# Patient Record
Sex: FEMALE | Race: WHITE | HISPANIC OR LATINO | Employment: FULL TIME | URBAN - METROPOLITAN AREA
[De-identification: names, ages, dates, MRNs, and addresses within clinical notes are randomized per-mention and may not be internally consistent; named-entity substitution may affect disease eponyms.]

---

## 2023-05-26 LAB
HUMAN PAPILLOMAVIRUS (HPV): POSITIVE
PAP RECOMMENDATION EXT: NORMAL
PAP SMEAR: NORMAL

## 2023-11-21 DIAGNOSIS — N83.202 BILATERAL OVARIAN CYSTS: ICD-10-CM

## 2023-11-21 DIAGNOSIS — N83.201 BILATERAL OVARIAN CYSTS: ICD-10-CM

## 2023-11-21 DIAGNOSIS — N83.201 UNSPECIFIED OVARIAN CYST, RIGHT SIDE: Primary | ICD-10-CM

## 2023-11-28 ENCOUNTER — HOSPITAL ENCOUNTER (OUTPATIENT)
Dept: RADIOLOGY | Facility: HOSPITAL | Age: 33
Discharge: HOME | End: 2023-11-28
Attending: SPECIALIST
Payer: OTHER GOVERNMENT

## 2023-11-28 DIAGNOSIS — N83.201 BILATERAL OVARIAN CYSTS: ICD-10-CM

## 2023-11-28 DIAGNOSIS — N83.202 BILATERAL OVARIAN CYSTS: ICD-10-CM

## 2023-11-28 PROCEDURE — 76830 US PELVIS COMP WITH TRANSVAG NON-OB (XPD): ICD-10-PCS | Mod: 26,,, | Performed by: RADIOLOGY

## 2023-11-28 PROCEDURE — 76830 TRANSVAGINAL US NON-OB: CPT | Mod: TC

## 2023-11-28 PROCEDURE — 76856 US EXAM PELVIC COMPLETE: CPT | Mod: 26,,, | Performed by: RADIOLOGY

## 2023-11-28 PROCEDURE — 76856 US PELVIS COMP WITH TRANSVAG NON-OB (XPD): ICD-10-PCS | Mod: 26,,, | Performed by: RADIOLOGY

## 2023-11-28 PROCEDURE — 76830 TRANSVAGINAL US NON-OB: CPT | Mod: 26,,, | Performed by: RADIOLOGY

## 2023-11-29 ENCOUNTER — TELEPHONE (OUTPATIENT)
Dept: FAMILY MEDICINE | Facility: CLINIC | Age: 33
End: 2023-11-29

## 2023-11-29 NOTE — TELEPHONE ENCOUNTER
Spoke with pt and confirmed new pt appt. Informed pt to bring all current medications to appt and if pt no shows we will be unable to reschedule them. Pt voiced understanding.

## 2023-12-05 ENCOUNTER — OFFICE VISIT (OUTPATIENT)
Dept: FAMILY MEDICINE | Facility: CLINIC | Age: 33
End: 2023-12-05
Payer: OTHER GOVERNMENT

## 2023-12-05 VITALS
SYSTOLIC BLOOD PRESSURE: 100 MMHG | HEIGHT: 63 IN | DIASTOLIC BLOOD PRESSURE: 70 MMHG | WEIGHT: 162 LBS | BODY MASS INDEX: 28.7 KG/M2 | HEART RATE: 69 BPM

## 2023-12-05 DIAGNOSIS — R53.83 FATIGUE, UNSPECIFIED TYPE: ICD-10-CM

## 2023-12-05 DIAGNOSIS — L65.9 HAIR LOSS: ICD-10-CM

## 2023-12-05 DIAGNOSIS — D50.9 IRON DEFICIENCY ANEMIA, UNSPECIFIED IRON DEFICIENCY ANEMIA TYPE: ICD-10-CM

## 2023-12-05 DIAGNOSIS — R68.89 INTOLERANCE TO COLD: ICD-10-CM

## 2023-12-05 DIAGNOSIS — Z76.89 ENCOUNTER TO ESTABLISH CARE: Primary | ICD-10-CM

## 2023-12-05 DIAGNOSIS — R63.1 EXCESSIVE THIRST: ICD-10-CM

## 2023-12-05 DIAGNOSIS — Z13.220 SCREENING CHOLESTEROL LEVEL: ICD-10-CM

## 2023-12-05 PROBLEM — M20.10 HALLUX VALGUS: Status: ACTIVE | Noted: 2023-04-13

## 2023-12-05 PROBLEM — H11.132 CONJUNCTIVAL PIGMENTATIONS, LEFT EYE: Status: ACTIVE | Noted: 2017-12-22

## 2023-12-05 PROCEDURE — 99204 OFFICE O/P NEW MOD 45 MIN: CPT | Mod: S$GLB,,,

## 2023-12-05 PROCEDURE — 99204 PR OFFICE/OUTPT VISIT, NEW, LEVL IV, 45-59 MIN: ICD-10-PCS | Mod: S$GLB,,,

## 2023-12-05 NOTE — PROGRESS NOTES
"  SUBJECTIVE:    Patient ID: Samia Higgins is a 33 y.o. female.    Chief Complaint: Establish Care (No medicine, Fatigue, loss hair, pt is fasting for blood work, abc )    33-year-old established female patient presents to clinic today to establish care.    Past medical history, surgical history, family history, social history, current medications, allergies reviewed.    Patient has acute complaints today of chronic extreme fatigue and thinning hair.  She states that her hair began sitting out last year.  She has never been pregnant.  She states she is also always cold.  She is fasting today and would like "blood work. "    She just recently found out that she has fibroids.  She was having some pelvic pain and she did see her OBGYN.  She states they are not causing her any extreme vaginal bleeding though her periods can be on the heavier side.  They are however regular..  She also states that her Pap smear did come back abnormal but her OB gyn back home that she was seeing prior to moving here did tell her that she was negative for strain 16 and strain 18 which was good news.  She is established care here with Dr. Maldonado who is following up.    She does drink occasionally.  She has never been a smoker.  She does not use illicit drugs.  She is only on a multi vitamin.    She states that despite daily walking, running when the weather is good, yoga she has had a little bit of weight gain earlier in the year.    She does wear glasses.  She had an eye exam this year.        Office Visit on 12/05/2023   Component Date Value Ref Range Status    WBC 12/05/2023 9.0  3.8 - 10.8 Thousand/uL Final    RBC 12/05/2023 4.79  3.80 - 5.10 Million/uL Final    Hemoglobin 12/05/2023 13.8  11.7 - 15.5 g/dL Final    Hematocrit 12/05/2023 41.6  35.0 - 45.0 % Final    MCV 12/05/2023 86.8  80.0 - 100.0 fL Final    MCH 12/05/2023 28.8  27.0 - 33.0 pg Final    MCHC 12/05/2023 33.2  32.0 - 36.0 g/dL Final    RDW 12/05/2023 13.0  11.0 - 15.0 % " Final    Platelets 12/05/2023 342  140 - 400 Thousand/uL Final    MPV 12/05/2023 10.4  7.5 - 12.5 fL Final    Neutrophils, Abs 12/05/2023 4,968  1,500 - 7,800 cells/uL Final    Lymph # 12/05/2023 3,213  850 - 3,900 cells/uL Final    Mono # 12/05/2023 702  200 - 950 cells/uL Final    Eos # 12/05/2023 36  15 - 500 cells/uL Final    Baso # 12/05/2023 81  0 - 200 cells/uL Final    Neutrophils Relative 12/05/2023 55.2  % Final    Lymph % 12/05/2023 35.7  % Final    Mono % 12/05/2023 7.8  % Final    Eosinophil % 12/05/2023 0.4  % Final    Basophil % 12/05/2023 0.9  % Final    Iron 12/05/2023 125  40 - 190 mcg/dL Final    TIBC 12/05/2023 327  250 - 450 mcg/dL (calc) Final    Iron Saturation 12/05/2023 38  16 - 45 % (calc) Final    Glucose 12/05/2023 74  65 - 99 mg/dL Final    BUN 12/05/2023 7  7 - 25 mg/dL Final    Creatinine 12/05/2023 0.70  0.50 - 0.97 mg/dL Final    eGFR 12/05/2023 118  > OR = 60 mL/min/1.73m2 Final    BUN/Creatinine Ratio 12/05/2023 SEE NOTE:  6 - 22 (calc) Final    Sodium 12/05/2023 139  135 - 146 mmol/L Final    Potassium 12/05/2023 4.4  3.5 - 5.3 mmol/L Final    Chloride 12/05/2023 103  98 - 110 mmol/L Final    CO2 12/05/2023 26  20 - 32 mmol/L Final    Calcium 12/05/2023 10.1  8.6 - 10.2 mg/dL Final    Total Protein 12/05/2023 7.3  6.1 - 8.1 g/dL Final    Albumin 12/05/2023 4.9  3.6 - 5.1 g/dL Final    Globulin, Total 12/05/2023 2.4  1.9 - 3.7 g/dL (calc) Final    Albumin/Globulin Ratio 12/05/2023 2.0  1.0 - 2.5 (calc) Final    Total Bilirubin 12/05/2023 0.7  0.2 - 1.2 mg/dL Final    Alkaline Phosphatase 12/05/2023 44  31 - 125 U/L Final    AST 12/05/2023 22  10 - 30 U/L Final    ALT 12/05/2023 21  6 - 29 U/L Final    Hemoglobin A1C 12/05/2023 5.5  <5.7 % of total Hgb Final    Cholesterol 12/05/2023 158  <200 mg/dL Final    HDL 12/05/2023 76  > OR = 50 mg/dL Final    Triglycerides 12/05/2023 93  <150 mg/dL Final    LDL Cholesterol 12/05/2023 64  mg/dL (calc) Final    HDL/Cholesterol Ratio  12/05/2023 2.1  <5.0 (calc) Final    Non HDL Chol. (LDL+VLDL) 12/05/2023 82  <130 mg/dL (calc) Final    T4, Free 12/05/2023 1.1  0.8 - 1.8 ng/dL Final    TSH 12/05/2023 2.01  mIU/L Final    Color, UA 12/05/2023 YELLOW  YELLOW Final    Appearance, UA 12/05/2023 CLEAR  CLEAR Final    Specific Gravity, UA 12/05/2023 1.004  1.001 - 1.035 Final    pH, UA 12/05/2023 8.0  5.0 - 8.0 Final    Glucose, UA 12/05/2023 NEGATIVE  NEGATIVE Final    Bilirubin, UA 12/05/2023 NEGATIVE  NEGATIVE Final    Ketones, UA 12/05/2023 NEGATIVE  NEGATIVE Final    Occult Blood UA 12/05/2023 NEGATIVE  NEGATIVE Final    Protein, UA 12/05/2023 NEGATIVE  NEGATIVE Final    Nitrite, UA 12/05/2023 NEGATIVE  NEGATIVE Final    Leukocytes, UA 12/05/2023 NEGATIVE  NEGATIVE Final    WBC Casts, UA 12/05/2023 NONE SEEN  < OR = 5 /HPF Final    RBC Casts, UA 12/05/2023 NONE SEEN  < OR = 2 /HPF Final    Squam Epithel, UA 12/05/2023 NONE SEEN  < OR = 5 /HPF Final    Bacteria, UA 12/05/2023 NONE SEEN  NONE SEEN /HPF Final    Hyaline Casts, UA 12/05/2023 NONE SEEN  NONE SEEN /LPF Final    Service Cmt: 12/05/2023    Final    Reflexive Urine Culture 12/05/2023    Final       No past medical history on file.  Social History     Socioeconomic History    Marital status:    Tobacco Use    Smoking status: Never    Smokeless tobacco: Never     No past surgical history on file.  No family history on file.    Review of patient's allergies indicates:   Allergen Reactions    Corn starch      Other Reaction(s): corn syrup causes redness over chest    Grass pollen-june grass standard Other (See Comments)    House dust Other (See Comments)       Current Outpatient Medications:     multivit with minerals/lutein (MULTIVITAMIN 50 PLUS ORAL), , Disp: , Rfl:     Review of Systems   Constitutional:  Positive for fatigue. Negative for activity change, appetite change, chills, diaphoresis, fever and unexpected weight change.   HENT:  Positive for postnasal drip. Negative for  "nasal congestion, ear pain, rhinorrhea, sore throat and trouble swallowing.    Eyes:  Negative for discharge and visual disturbance.   Respiratory:  Negative for apnea, cough, shortness of breath and wheezing.    Cardiovascular:  Negative for chest pain, palpitations and leg swelling.   Gastrointestinal:  Negative for abdominal distention, abdominal pain, blood in stool, constipation, diarrhea, nausea, vomiting and reflux.   Endocrine: Positive for cold intolerance.   Genitourinary:  Positive for pelvic pain. Negative for bladder incontinence, dysuria, frequency and urgency.        Found out she has fibroids   Musculoskeletal:  Negative for arthralgias, gait problem, leg pain and myalgias.   Integumentary:  Negative for rash and mole/lesion.   Allergic/Immunologic: Positive for environmental allergies.   Neurological:  Negative for dizziness, tremors, weakness, light-headedness, numbness and headaches.   Hematological:  Does not bruise/bleed easily.   Psychiatric/Behavioral:  Negative for dysphoric mood, sleep disturbance and suicidal ideas. The patient is not nervous/anxious.            Objective:      Vitals:    12/05/23 1119   BP: 100/70   Pulse: 69   Weight: 73.5 kg (162 lb)   Height: 5' 3" (1.6 m)     Physical Exam  Vitals and nursing note reviewed.   Constitutional:       General: She is not in acute distress.     Appearance: Normal appearance. She is well-developed, well-groomed and normal weight. She is not ill-appearing.   HENT:      Head: Normocephalic and atraumatic.      Right Ear: Tympanic membrane and external ear normal.      Left Ear: Tympanic membrane and external ear normal.      Nose: Nose normal. No rhinorrhea.      Mouth/Throat:      Mouth: Mucous membranes are moist.      Pharynx: Oropharynx is clear. No posterior oropharyngeal erythema.   Eyes:      General: No scleral icterus.     Pupils: Pupils are equal, round, and reactive to light.   Neck:      Thyroid: No thyromegaly.      Vascular: No " carotid bruit.   Cardiovascular:      Rate and Rhythm: Normal rate and regular rhythm.      Heart sounds: Normal heart sounds. No murmur heard.  Pulmonary:      Effort: Pulmonary effort is normal.      Breath sounds: Normal breath sounds. No wheezing or rales.   Abdominal:      General: Bowel sounds are normal.      Palpations: Abdomen is soft.      Tenderness: There is no abdominal tenderness.   Musculoskeletal:         General: Normal range of motion.      Cervical back: Normal range of motion and neck supple.      Lumbar back: Normal. No spasms.      Right lower leg: No edema.      Left lower leg: No edema.      Comments: Bends 90 degrees at  waist, good range of motion throughout   Skin:     General: Skin is warm and dry.      Capillary Refill: Capillary refill takes less than 2 seconds.      Coloration: Skin is not jaundiced or pale.      Findings: No rash.   Neurological:      General: No focal deficit present.      Mental Status: She is alert and oriented to person, place, and time.      Cranial Nerves: No cranial nerve deficit.      Motor: No weakness.      Coordination: Coordination normal.      Gait: Gait normal.   Psychiatric:         Mood and Affect: Mood normal.         Behavior: Behavior normal. Behavior is cooperative.         Thought Content: Thought content normal.         Judgment: Judgment normal.           Assessment:       1. Encounter to establish care    2. Hair loss    3. Fatigue, unspecified type    4. Excessive thirst    5. Intolerance to cold    6. Screening cholesterol level    7. Iron deficiency anemia, unspecified iron deficiency anemia type         Plan:       Encounter to establish care  See HPI.  Patient concerned that she may have something going on hormonally.   -     CBC Auto Differential; Future; Expected date: 12/05/2023  -     Urinalysis, Reflex to Urine Culture Urine, Clean Catch; Future; Expected date: 12/05/2023    Hair loss  Would like her thyroid checked.  -     T4,  Free; Future; Expected date: 12/05/2023  -     TSH; Future; Expected date: 12/05/2023    Fatigue, unspecified type  Also has a history of anemia.  -     T4, Free; Future; Expected date: 12/05/2023  -     TSH; Future; Expected date: 12/05/2023    Excessive thirst  Feels like she has been craving a lot more water lately.  -     Comprehensive Metabolic Panel; Future; Expected date: 12/05/2023  -     Hemoglobin A1C; Future; Expected date: 12/05/2023    Intolerance to cold  Labs for evaluation and treatment.  -     T4, Free; Future; Expected date: 12/05/2023  -     TSH; Future; Expected date: 12/05/2023    Screening cholesterol level  Screening as recommended.  -     Lipid Panel; Future; Expected date: 12/05/2023    Iron deficiency anemia, unspecified iron deficiency anemia type  History of anemia.  -     CBC Auto Differential; Future; Expected date: 12/05/2023  -     Iron and TIBC; Future; Expected date: 12/05/2023      Follow up in about 6 months (around 6/5/2024), or if symptoms worsen or fail to improve, for or follow up sooner depending on results.        12/26/2023 Lisa Rebolledo

## 2023-12-06 ENCOUNTER — PATIENT OUTREACH (OUTPATIENT)
Dept: ADMINISTRATIVE | Facility: HOSPITAL | Age: 33
End: 2023-12-06
Payer: OTHER GOVERNMENT

## 2023-12-06 NOTE — PROGRESS NOTES
Population Health Chart Review & Patient Outreach Details    Outreach Performed: NO    Additional Pop Health Notes:           Updates Requested / Reviewed:      Updated Care Coordination Note, External Sources: [facility] and [facility], and Immunizations Reconciliation Completed or Queried: [place]         Health Maintenance Topics Overdue:    Health Maintenance Due   Topic Date Due    Hepatitis C Screening  Never done    Cervical Cancer Screening  Never done    COVID-19 Vaccine (1) Never done    HIV Screening  Never done    TETANUS VACCINE  03/23/2021         Health Maintenance Topic(s) Outreach Outcomes & Actions Taken:    Cervical Cancer Screening - Outreach Outcomes & Actions Taken  : External Records Requested & Care Team Updated if Applicable

## 2023-12-06 NOTE — LETTER
AUTHORIZATION FOR RELEASE OF   CONFIDENTIAL INFORMATION    Dear Ross Fletcher,    We are seeing Samia Higgins, date of birth 1990, in the clinic at [address]. Esau Colon MD is the patient's PCP. Samia Higgins has an outstanding lab/procedure at the time we reviewed her chart. In order to help keep her health information updated, she has authorized us to request the following medical record(s):                                                                    ( X )  PAP SMEAR ( Most Recent )                                                   Please fax records to [facility], Casey, Michael D., MD, [phone]     If you have any questions, please contact     [facility]  Clinical Care Coordinator    [facility]  [phone] (Phone)  [phone] (Fax)    Patient Name: Samia Higgins  : 1990  Patient Phone #: [phone]

## 2023-12-07 ENCOUNTER — TELEPHONE (OUTPATIENT)
Dept: FAMILY MEDICINE | Facility: CLINIC | Age: 33
End: 2023-12-07

## 2023-12-07 LAB
ALBUMIN SERPL-MCNC: 4.9 G/DL (ref 3.6–5.1)
ALBUMIN/GLOB SERPL: 2 (CALC) (ref 1–2.5)
ALP SERPL-CCNC: 44 U/L (ref 31–125)
ALT SERPL-CCNC: 21 U/L (ref 6–29)
APPEARANCE UR: CLEAR
AST SERPL-CCNC: 22 U/L (ref 10–30)
BACTERIA #/AREA URNS HPF: NORMAL /HPF
BACTERIA UR CULT: NORMAL
BASOPHILS # BLD AUTO: 81 CELLS/UL (ref 0–200)
BASOPHILS NFR BLD AUTO: 0.9 %
BILIRUB SERPL-MCNC: 0.7 MG/DL (ref 0.2–1.2)
BILIRUB UR QL STRIP: NEGATIVE
BUN SERPL-MCNC: 7 MG/DL (ref 7–25)
BUN/CREAT SERPL: NORMAL (CALC) (ref 6–22)
CALCIUM SERPL-MCNC: 10.1 MG/DL (ref 8.6–10.2)
CHLORIDE SERPL-SCNC: 103 MMOL/L (ref 98–110)
CHOLEST SERPL-MCNC: 158 MG/DL
CHOLEST/HDLC SERPL: 2.1 (CALC)
CO2 SERPL-SCNC: 26 MMOL/L (ref 20–32)
COLOR UR: YELLOW
CREAT SERPL-MCNC: 0.7 MG/DL (ref 0.5–0.97)
EGFR: 118 ML/MIN/1.73M2
EOSINOPHIL # BLD AUTO: 36 CELLS/UL (ref 15–500)
EOSINOPHIL NFR BLD AUTO: 0.4 %
ERYTHROCYTE [DISTWIDTH] IN BLOOD BY AUTOMATED COUNT: 13 % (ref 11–15)
GLOBULIN SER CALC-MCNC: 2.4 G/DL (CALC) (ref 1.9–3.7)
GLUCOSE SERPL-MCNC: 74 MG/DL (ref 65–99)
GLUCOSE UR QL STRIP: NEGATIVE
HBA1C MFR BLD: 5.5 % OF TOTAL HGB
HCT VFR BLD AUTO: 41.6 % (ref 35–45)
HDLC SERPL-MCNC: 76 MG/DL
HGB BLD-MCNC: 13.8 G/DL (ref 11.7–15.5)
HGB UR QL STRIP: NEGATIVE
HYALINE CASTS #/AREA URNS LPF: NORMAL /LPF
IRON SATN MFR SERPL: 38 % (CALC) (ref 16–45)
IRON SERPL-MCNC: 125 MCG/DL (ref 40–190)
KETONES UR QL STRIP: NEGATIVE
LDLC SERPL CALC-MCNC: 64 MG/DL (CALC)
LEUKOCYTE ESTERASE UR QL STRIP: NEGATIVE
LYMPHOCYTES # BLD AUTO: 3213 CELLS/UL (ref 850–3900)
LYMPHOCYTES NFR BLD AUTO: 35.7 %
MCH RBC QN AUTO: 28.8 PG (ref 27–33)
MCHC RBC AUTO-ENTMCNC: 33.2 G/DL (ref 32–36)
MCV RBC AUTO: 86.8 FL (ref 80–100)
MONOCYTES # BLD AUTO: 702 CELLS/UL (ref 200–950)
MONOCYTES NFR BLD AUTO: 7.8 %
NEUTROPHILS # BLD AUTO: 4968 CELLS/UL (ref 1500–7800)
NEUTROPHILS NFR BLD AUTO: 55.2 %
NITRITE UR QL STRIP: NEGATIVE
NONHDLC SERPL-MCNC: 82 MG/DL (CALC)
PH UR STRIP: 8 [PH] (ref 5–8)
PLATELET # BLD AUTO: 342 THOUSAND/UL (ref 140–400)
PMV BLD REES-ECKER: 10.4 FL (ref 7.5–12.5)
POTASSIUM SERPL-SCNC: 4.4 MMOL/L (ref 3.5–5.3)
PROT SERPL-MCNC: 7.3 G/DL (ref 6.1–8.1)
PROT UR QL STRIP: NEGATIVE
RBC # BLD AUTO: 4.79 MILLION/UL (ref 3.8–5.1)
RBC #/AREA URNS HPF: NORMAL /HPF
SERVICE CMNT-IMP: NORMAL
SODIUM SERPL-SCNC: 139 MMOL/L (ref 135–146)
SP GR UR STRIP: 1 (ref 1–1.03)
SQUAMOUS #/AREA URNS HPF: NORMAL /HPF
T4 FREE SERPL-MCNC: 1.1 NG/DL (ref 0.8–1.8)
TIBC SERPL-MCNC: 327 MCG/DL (CALC) (ref 250–450)
TRIGL SERPL-MCNC: 93 MG/DL
TSH SERPL-ACNC: 2.01 MIU/L
WBC # BLD AUTO: 9 THOUSAND/UL (ref 3.8–10.8)
WBC #/AREA URNS HPF: NORMAL /HPF

## 2023-12-07 NOTE — TELEPHONE ENCOUNTER
Spoke with patient gave lab results verbatim per Rebecca DE PAZ Patient verbalized understanding. No further questions.

## 2023-12-07 NOTE — PROGRESS NOTES
Let MsDelfin Ronaldo know that absolutely all of her labs are completely within normal ranges. Superb!

## 2023-12-21 ENCOUNTER — PATIENT OUTREACH (OUTPATIENT)
Dept: ADMINISTRATIVE | Facility: HOSPITAL | Age: 33
End: 2023-12-21
Payer: OTHER GOVERNMENT

## 2023-12-21 NOTE — PROGRESS NOTES
Population Health Chart Review & Patient Outreach Details    Outreach Performed: NO    Additional Pop Health Notes:           Updates Requested / Reviewed:      Updated Care Coordination Note         Health Maintenance Topics Overdue:    Health Maintenance Due   Topic Date Due    Hepatitis C Screening  Never done    COVID-19 Vaccine (1) Never done    HIV Screening  Never done    TETANUS VACCINE  03/23/2021         Health Maintenance Topic(s) Outreach Outcomes & Actions Taken:    Cervical Cancer Screening - Outreach Outcomes & Actions Taken  : External Records Uploaded & Care Team Updated if Applicable

## 2024-03-19 ENCOUNTER — OFFICE VISIT (OUTPATIENT)
Dept: FAMILY MEDICINE | Facility: CLINIC | Age: 34
End: 2024-03-19
Payer: OTHER GOVERNMENT

## 2024-03-19 VITALS
TEMPERATURE: 99 F | WEIGHT: 160.88 LBS | SYSTOLIC BLOOD PRESSURE: 104 MMHG | HEIGHT: 63 IN | HEART RATE: 72 BPM | OXYGEN SATURATION: 98 % | DIASTOLIC BLOOD PRESSURE: 72 MMHG | BODY MASS INDEX: 28.5 KG/M2

## 2024-03-19 DIAGNOSIS — H65.92 LEFT OTITIS MEDIA WITH EFFUSION: ICD-10-CM

## 2024-03-19 DIAGNOSIS — R42 VERTIGO: Primary | ICD-10-CM

## 2024-03-19 PROCEDURE — 99213 OFFICE O/P EST LOW 20 MIN: CPT | Mod: S$GLB,,,

## 2024-03-19 RX ORDER — MECLIZINE HCL 12.5 MG 12.5 MG/1
TABLET ORAL
Qty: 20 TABLET | Refills: 1 | Status: SHIPPED | OUTPATIENT
Start: 2024-03-19

## 2024-03-19 RX ORDER — CHOLECALCIFEROL (VITAMIN D3) 25 MCG
1000 TABLET ORAL DAILY
COMMUNITY

## 2024-03-19 RX ORDER — AMOXICILLIN 875 MG/1
875 TABLET, FILM COATED ORAL EVERY 12 HOURS
Qty: 14 TABLET | Refills: 0 | Status: SHIPPED | OUTPATIENT
Start: 2024-03-19 | End: 2024-03-26

## 2024-03-19 NOTE — PATIENT INSTRUCTIONS
"Make continue to take zofran if needed just for nausea    Take meclizine for dizziness as needed, as directed on prescription    May continue to take antihistamine to "dry up" allergies    Take all your antibiotics for ear infection. If you do not feel better in a week, let me know.       "

## 2024-03-19 NOTE — PROGRESS NOTES
SUBJECTIVE:    Patient ID: Samia Higgins is a 34 y.o. female.    Chief Complaint: Dizziness (4 days with vertigo, has gotten better, right ear feels like draining but no fluid coming out/ did Elpey maneuver and when tilted got dizzier/ had some nausea/ denies illness/ possibly allergies per patient/ no ear pain no ear fullness//mp)    34-year-old established female patient presents to clinic today with complaints of dizziness x4 days.  She states her right ear feels like it is draining but there is no fluid coming out.  She has had some nausea.      Dizziness:    Associated symptoms: ear pain.      Patient Outreach on 12/21/2023   Component Date Value Ref Range Status    PAP Recommendation External 05/26/2023 Cotesting in 5 years   Final    Pap 05/26/2023 Negative for intraephithelial lesion or malignancy  Negative for intraephithelial lesion or malignancy, Other Final    HPV DNA 05/26/2023 Positive (A)  None Detected Final   Office Visit on 12/05/2023   Component Date Value Ref Range Status    WBC 12/05/2023 9.0  3.8 - 10.8 Thousand/uL Final    RBC 12/05/2023 4.79  3.80 - 5.10 Million/uL Final    Hemoglobin 12/05/2023 13.8  11.7 - 15.5 g/dL Final    Hematocrit 12/05/2023 41.6  35.0 - 45.0 % Final    MCV 12/05/2023 86.8  80.0 - 100.0 fL Final    MCH 12/05/2023 28.8  27.0 - 33.0 pg Final    MCHC 12/05/2023 33.2  32.0 - 36.0 g/dL Final    RDW 12/05/2023 13.0  11.0 - 15.0 % Final    Platelets 12/05/2023 342  140 - 400 Thousand/uL Final    MPV 12/05/2023 10.4  7.5 - 12.5 fL Final    Neutrophils, Abs 12/05/2023 4,968  1,500 - 7,800 cells/uL Final    Lymph # 12/05/2023 3,213  850 - 3,900 cells/uL Final    Mono # 12/05/2023 702  200 - 950 cells/uL Final    Eos # 12/05/2023 36  15 - 500 cells/uL Final    Baso # 12/05/2023 81  0 - 200 cells/uL Final    Neutrophils Relative 12/05/2023 55.2  % Final    Lymph % 12/05/2023 35.7  % Final    Mono % 12/05/2023 7.8  % Final    Eosinophil % 12/05/2023 0.4  % Final    Basophil %  12/05/2023 0.9  % Final    Iron 12/05/2023 125  40 - 190 mcg/dL Final    TIBC 12/05/2023 327  250 - 450 mcg/dL (calc) Final    Iron Saturation 12/05/2023 38  16 - 45 % (calc) Final    Glucose 12/05/2023 74  65 - 99 mg/dL Final    BUN 12/05/2023 7  7 - 25 mg/dL Final    Creatinine 12/05/2023 0.70  0.50 - 0.97 mg/dL Final    eGFR 12/05/2023 118  > OR = 60 mL/min/1.73m2 Final    BUN/Creatinine Ratio 12/05/2023 SEE NOTE:  6 - 22 (calc) Final    Sodium 12/05/2023 139  135 - 146 mmol/L Final    Potassium 12/05/2023 4.4  3.5 - 5.3 mmol/L Final    Chloride 12/05/2023 103  98 - 110 mmol/L Final    CO2 12/05/2023 26  20 - 32 mmol/L Final    Calcium 12/05/2023 10.1  8.6 - 10.2 mg/dL Final    Total Protein 12/05/2023 7.3  6.1 - 8.1 g/dL Final    Albumin 12/05/2023 4.9  3.6 - 5.1 g/dL Final    Globulin, Total 12/05/2023 2.4  1.9 - 3.7 g/dL (calc) Final    Albumin/Globulin Ratio 12/05/2023 2.0  1.0 - 2.5 (calc) Final    Total Bilirubin 12/05/2023 0.7  0.2 - 1.2 mg/dL Final    Alkaline Phosphatase 12/05/2023 44  31 - 125 U/L Final    AST 12/05/2023 22  10 - 30 U/L Final    ALT 12/05/2023 21  6 - 29 U/L Final    Hemoglobin A1C 12/05/2023 5.5  <5.7 % of total Hgb Final    Cholesterol 12/05/2023 158  <200 mg/dL Final    HDL 12/05/2023 76  > OR = 50 mg/dL Final    Triglycerides 12/05/2023 93  <150 mg/dL Final    LDL Cholesterol 12/05/2023 64  mg/dL (calc) Final    HDL/Cholesterol Ratio 12/05/2023 2.1  <5.0 (calc) Final    Non HDL Chol. (LDL+VLDL) 12/05/2023 82  <130 mg/dL (calc) Final    T4, Free 12/05/2023 1.1  0.8 - 1.8 ng/dL Final    TSH 12/05/2023 2.01  mIU/L Final    Color, UA 12/05/2023 YELLOW  YELLOW Final    Appearance, UA 12/05/2023 CLEAR  CLEAR Final    Specific Gravity, UA 12/05/2023 1.004  1.001 - 1.035 Final    pH, UA 12/05/2023 8.0  5.0 - 8.0 Final    Glucose, UA 12/05/2023 NEGATIVE  NEGATIVE Final    Bilirubin, UA 12/05/2023 NEGATIVE  NEGATIVE Final    Ketones, UA 12/05/2023 NEGATIVE  NEGATIVE Final    Occult Blood UA  12/05/2023 NEGATIVE  NEGATIVE Final    Protein, UA 12/05/2023 NEGATIVE  NEGATIVE Final    Nitrite, UA 12/05/2023 NEGATIVE  NEGATIVE Final    Leukocytes, UA 12/05/2023 NEGATIVE  NEGATIVE Final    WBC Casts, UA 12/05/2023 NONE SEEN  < OR = 5 /HPF Final    RBC Casts, UA 12/05/2023 NONE SEEN  < OR = 2 /HPF Final    Squam Epithel, UA 12/05/2023 NONE SEEN  < OR = 5 /HPF Final    Bacteria, UA 12/05/2023 NONE SEEN  NONE SEEN /HPF Final    Hyaline Casts, UA 12/05/2023 NONE SEEN  NONE SEEN /LPF Final    Service Cmt: 12/05/2023    Final    Reflexive Urine Culture 12/05/2023    Final       History reviewed. No pertinent past medical history.  Social History     Socioeconomic History    Marital status:    Tobacco Use    Smoking status: Never    Smokeless tobacco: Never     History reviewed. No pertinent surgical history.  History reviewed. No pertinent family history.    Review of patient's allergies indicates:   Allergen Reactions    Corn starch      Other Reaction(s): corn syrup causes redness over chest    Grass pollen-june grass standard Other (See Comments)    House dust Other (See Comments)       Current Outpatient Medications:     microfibrillar collagen powder, Apply 1 g topically once daily., Disp: , Rfl:     multivit with minerals/lutein (MULTIVITAMIN 50 PLUS ORAL), , Disp: , Rfl:     vitamin D (VITAMIN D3) 1000 units Tab, Take 1,000 Units by mouth once daily., Disp: , Rfl:     amoxicillin (AMOXIL) 875 MG tablet, Take 1 tablet (875 mg total) by mouth every 12 (twelve) hours. for 7 days, Disp: 14 tablet, Rfl: 0    meclizine (ANTIVERT) 12.5 mg tablet, Take one tablet or two tablets every 8 hours as needed for dizziness/vertigo, Disp: 20 tablet, Rfl: 1    Review of Systems   HENT:  Positive for ear pain.    Neurological:  Positive for dizziness and vertigo.           Objective:      Vitals:    03/19/24 0926   BP: 104/72   Pulse: 72   Temp: 99.1 °F (37.3 °C)   SpO2: 98%   Weight: 73 kg (160 lb 14.4 oz)   Height: 5'  "3" (1.6 m)     Physical Exam  Constitutional:       General: She is not in acute distress.     Appearance: Normal appearance. She is not ill-appearing.   HENT:      Head: Normocephalic and atraumatic.      Right Ear: Tympanic membrane, ear canal and external ear normal.      Left Ear: A middle ear effusion is present. Tympanic membrane is erythematous and bulging.      Nose: Nose normal. No congestion.      Mouth/Throat:      Mouth: Mucous membranes are moist.      Pharynx: Oropharynx is clear.   Eyes:      General:         Right eye: No discharge.         Left eye: No discharge.   Cardiovascular:      Pulses: Normal pulses.   Pulmonary:      Effort: Pulmonary effort is normal.   Lymphadenopathy:      Cervical: No cervical adenopathy.   Skin:     General: Skin is warm and dry.   Neurological:      General: No focal deficit present.      Mental Status: She is alert.      Coordination: Coordination normal.           Assessment:       1. Vertigo    2. Left otitis media with effusion         Plan:       Vertigo  Take 1-2 tablets of meclizine as needed every 8 hours for dizziness  -     meclizine (ANTIVERT) 12.5 mg tablet; Take one tablet or two tablets every 8 hours as needed for dizziness/vertigo  Dispense: 20 tablet; Refill: 1    Left otitis media with effusion  Take 1 tablet of amoxicillin twice a day for 7 days.  Continue to take even if you feel better.  -     amoxicillin (AMOXIL) 875 MG tablet; Take 1 tablet (875 mg total) by mouth every 12 (twelve) hours. for 7 days  Dispense: 14 tablet; Refill: 0      Follow up if symptoms worsen or fail to improve.        3/24/2024 Lisa Rebolledo"

## 2025-01-02 ENCOUNTER — HOSPITAL ENCOUNTER (EMERGENCY)
Facility: HOSPITAL | Age: 35
Discharge: HOME | End: 2025-01-02
Attending: EMERGENCY MEDICINE
Payer: OTHER GOVERNMENT

## 2025-01-02 VITALS
HEIGHT: 63 IN | SYSTOLIC BLOOD PRESSURE: 118 MMHG | TEMPERATURE: 98 F | WEIGHT: 160 LBS | OXYGEN SATURATION: 100 % | HEART RATE: 72 BPM | RESPIRATION RATE: 16 BRPM | BODY MASS INDEX: 28.35 KG/M2 | DIASTOLIC BLOOD PRESSURE: 78 MMHG

## 2025-01-02 DIAGNOSIS — R56.9 SEIZURE-LIKE ACTIVITY: Primary | ICD-10-CM
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
## 2025-01-02 LAB
ALBUMIN SERPL BCP-MCNC: 4.7 G/DL (ref 3.5–5.2)
ALP SERPL-CCNC: 55 U/L (ref 55–135)
ALT SERPL W/O P-5'-P-CCNC: 18 U/L (ref 10–44)
AMPHET+METHAMPHET UR QL: NEGATIVE
ANION GAP SERPL CALC-SCNC: 5 MMOL/L (ref 8–16)
AST SERPL-CCNC: 15 U/L (ref 10–40)
B-HCG UR QL: NEGATIVE
BARBITURATES UR QL SCN>200 NG/ML: NEGATIVE
BASOPHILS # BLD AUTO: 0.07 K/UL (ref 0–0.2)
BASOPHILS NFR BLD: 0.7 % (ref 0–1.9)
BENZODIAZ UR QL SCN>200 NG/ML: NEGATIVE
BILIRUB SERPL-MCNC: 0.4 MG/DL (ref 0.1–1)
BILIRUB UR QL STRIP: NEGATIVE
BUN SERPL-MCNC: 8 MG/DL (ref 6–20)
BZE UR QL SCN: NEGATIVE
CALCIUM SERPL-MCNC: 9.5 MG/DL (ref 8.7–10.5)
CANNABINOIDS UR QL SCN: NEGATIVE
CHLORIDE SERPL-SCNC: 107 MMOL/L (ref 95–110)
CLARITY UR: CLEAR
CO2 SERPL-SCNC: 27 MMOL/L (ref 23–29)
COLOR UR: COLORLESS
CREAT SERPL-MCNC: 0.7 MG/DL (ref 0.5–1.4)
CREAT UR-MCNC: 28 MG/DL (ref 15–325)
CTP QC/QA: YES
DIFFERENTIAL METHOD BLD: NORMAL
EOSINOPHIL # BLD AUTO: 0.1 K/UL (ref 0–0.5)
EOSINOPHIL NFR BLD: 0.7 % (ref 0–8)
ERYTHROCYTE [DISTWIDTH] IN BLOOD BY AUTOMATED COUNT: 14.4 % (ref 11.5–14.5)
EST. GFR  (NO RACE VARIABLE): >60 ML/MIN/1.73 M^2
GLUCOSE SERPL-MCNC: 98 MG/DL (ref 70–110)
GLUCOSE UR QL STRIP: NEGATIVE
HCT VFR BLD AUTO: 40.7 % (ref 37–48.5)
HGB BLD-MCNC: 13.2 G/DL (ref 12–16)
HGB UR QL STRIP: NEGATIVE
IMM GRANULOCYTES # BLD AUTO: 0.04 K/UL (ref 0–0.04)
IMM GRANULOCYTES NFR BLD AUTO: 0.4 % (ref 0–0.5)
KETONES UR QL STRIP: NEGATIVE
LACTATE SERPL-SCNC: 1.4 MMOL/L (ref 0.5–1.9)
LEUKOCYTE ESTERASE UR QL STRIP: NEGATIVE
LYMPHOCYTES # BLD AUTO: 2.5 K/UL (ref 1–4.8)
LYMPHOCYTES NFR BLD: 24.3 % (ref 18–48)
MAGNESIUM SERPL-MCNC: 2.2 MG/DL (ref 1.6–2.6)
MCH RBC QN AUTO: 27.7 PG (ref 27–31)
MCHC RBC AUTO-ENTMCNC: 32.4 G/DL (ref 32–36)
MCV RBC AUTO: 86 FL (ref 82–98)
MONOCYTES # BLD AUTO: 0.7 K/UL (ref 0.3–1)
MONOCYTES NFR BLD: 6.5 % (ref 4–15)
NEUTROPHILS # BLD AUTO: 7 K/UL (ref 1.8–7.7)
NEUTROPHILS NFR BLD: 67.4 % (ref 38–73)
NITRITE UR QL STRIP: NEGATIVE
NRBC BLD-RTO: 0 /100 WBC
OPIATES UR QL SCN: NEGATIVE
PCP UR QL SCN>25 NG/ML: NEGATIVE
PH UR STRIP: 6 [PH] (ref 5–8)
PHOSPHATE SERPL-MCNC: 3.3 MG/DL (ref 2.7–4.5)
PLATELET # BLD AUTO: 333 K/UL (ref 150–450)
PMV BLD AUTO: 9.5 FL (ref 9.2–12.9)
POTASSIUM SERPL-SCNC: 4.3 MMOL/L (ref 3.5–5.1)
PROT SERPL-MCNC: 7.4 G/DL (ref 6–8.4)
PROT UR QL STRIP: NEGATIVE
RBC # BLD AUTO: 4.76 M/UL (ref 4–5.4)
SODIUM SERPL-SCNC: 139 MMOL/L (ref 136–145)
SP GR UR STRIP: <1.005 (ref 1–1.03)
TOXICOLOGY INFORMATION: NORMAL
TSH SERPL DL<=0.005 MIU/L-ACNC: 1.12 UIU/ML (ref 0.34–5.6)
URN SPEC COLLECT METH UR: ABNORMAL
UROBILINOGEN UR STRIP-ACNC: NEGATIVE EU/DL
WBC # BLD AUTO: 10.44 K/UL (ref 3.9–12.7)

## 2025-01-02 PROCEDURE — 83605 ASSAY OF LACTIC ACID: CPT | Performed by: EMERGENCY MEDICINE

## 2025-01-02 PROCEDURE — 93005 ELECTROCARDIOGRAM TRACING: CPT | Performed by: INTERNAL MEDICINE

## 2025-01-02 PROCEDURE — 84443 ASSAY THYROID STIM HORMONE: CPT | Performed by: EMERGENCY MEDICINE

## 2025-01-02 PROCEDURE — 81003 URINALYSIS AUTO W/O SCOPE: CPT | Mod: 59 | Performed by: EMERGENCY MEDICINE

## 2025-01-02 PROCEDURE — 80053 COMPREHEN METABOLIC PANEL: CPT | Performed by: EMERGENCY MEDICINE

## 2025-01-02 PROCEDURE — 80307 DRUG TEST PRSMV CHEM ANLYZR: CPT | Performed by: EMERGENCY MEDICINE

## 2025-01-02 PROCEDURE — 81025 URINE PREGNANCY TEST: CPT | Performed by: EMERGENCY MEDICINE

## 2025-01-02 PROCEDURE — 84100 ASSAY OF PHOSPHORUS: CPT | Performed by: EMERGENCY MEDICINE

## 2025-01-02 PROCEDURE — 83735 ASSAY OF MAGNESIUM: CPT | Performed by: EMERGENCY MEDICINE

## 2025-01-02 PROCEDURE — 93010 ELECTROCARDIOGRAM REPORT: CPT | Mod: ,,, | Performed by: INTERNAL MEDICINE

## 2025-01-02 PROCEDURE — 99285 EMERGENCY DEPT VISIT HI MDM: CPT | Mod: 25

## 2025-01-02 PROCEDURE — 85025 COMPLETE CBC W/AUTO DIFF WBC: CPT | Performed by: EMERGENCY MEDICINE

## 2025-01-02 NOTE — ED PROVIDER NOTES
Encounter Date: 1/2/2025       History     Chief Complaint   Patient presents with    Seizures     Pt reports having a seizure at 1430, LOC lasting 1 minute , eyes rolled back and body convulsing. Denies hx of seizures. Witnessed by pt's uncle pt Annmarie in triage.     Fall     Slipped down stairs hitting L side, tried catching herself and was out of breath, had seizure. Couldn't recall if hit head but L head hurts along with L leg and L buttocks, L shoulder and back     HPI 34-year-old woman who presents emergency department for possible seizure.  Patient states she was walking downstairs at her house when she slipped and fell backwards landing on her buttock and left shoulder.  She denies hitting her head and was holding onto the banister.  She reports being any to feel bad while she was still laying on the stairs and her uncle reports that her eyes rolled in the back of the head and patient had seizure activity that lasted less than a minute.  Patient states that she came to and asked her on-call she had had a seizure.  There was no postictal state, no urinary incontinence or tongue biting.  She complains of left buttock pain and left upper back pain.  No history of seizures in the past.  No drug use.  Review of patient's allergies indicates:   Allergen Reactions    Corn starch      Other Reaction(s): corn syrup causes redness over chest    Grass pollen-june grass standard Other (See Comments)    House dust Other (See Comments)     No past medical history on file.  No past surgical history on file.  No family history on file.  Social History     Tobacco Use    Smoking status: Never    Smokeless tobacco: Never     Review of Systems   Constitutional:  Negative for fever.   HENT:  Negative for sore throat.    Respiratory:  Negative for shortness of breath.    Cardiovascular:  Negative for chest pain.   Gastrointestinal:  Negative for nausea.   Genitourinary:  Negative for dysuria.   Musculoskeletal:  Positive for back  pain.   Skin:  Negative for rash.   Neurological:  Positive for seizures. Negative for weakness.   Hematological:  Does not bruise/bleed easily.       Physical Exam     Initial Vitals [01/02/25 1505]   BP Pulse Resp Temp SpO2   126/83 103 16 98.4 °F (36.9 °C) 100 %      MAP       --         Physical Exam    Constitutional: Vital signs are normal. She appears well-developed and well-nourished.  Non-toxic appearance. No distress.   HENT:   Head: Normocephalic and atraumatic.   Eyes: EOM are normal. Pupils are equal, round, and reactive to light.   Neck: Neck supple. No JVD present.   Normal range of motion.  Cardiovascular:  Normal rate, regular rhythm, normal heart sounds and intact distal pulses.     Exam reveals no gallop and no friction rub.       No murmur heard.  Pulmonary/Chest: Breath sounds normal. She has no wheezes. She has no rhonchi. She has no rales.   Abdominal: Abdomen is soft. Bowel sounds are normal. There is no abdominal tenderness. There is no rebound and no guarding.   Musculoskeletal:         General: Normal range of motion.      Cervical back: Normal range of motion and neck supple. No rigidity.      Comments: Tenderness of the left buttock and muscles of the left upper back.  No midline bony tenderness of the spine or cervical neck.  No tenderness of the shoulder or scapula.     Neurological: She is alert and oriented to person, place, and time. She has normal strength and normal reflexes. No cranial nerve deficit or sensory deficit. She exhibits normal muscle tone. Coordination normal. GCS score is 15. GCS eye subscore is 4. GCS verbal subscore is 5. GCS motor subscore is 6.   Ambulatory with tandem gait.   Skin: Skin is warm and dry.   Psychiatric: She has a normal mood and affect. Her speech is normal and behavior is normal. She is not actively hallucinating.         ED Course   Procedures  Labs Reviewed   COMPREHENSIVE METABOLIC PANEL - Abnormal       Result Value    Sodium 139       Potassium 4.3      Chloride 107      CO2 27      Glucose 98      BUN 8      Creatinine 0.7      Calcium 9.5      Total Protein 7.4      Albumin 4.7      Total Bilirubin 0.4      Alkaline Phosphatase 55      AST 15      ALT 18      eGFR >60.0      Anion Gap 5 (*)    URINALYSIS, REFLEX TO URINE CULTURE - Abnormal    Specimen UA Urine, Clean Catch      Color, UA Colorless (*)     Appearance, UA Clear      pH, UA 6.0      Specific Gravity, UA <1.005 (*)     Protein, UA Negative      Glucose, UA Negative      Ketones, UA Negative      Bilirubin (UA) Negative      Occult Blood UA Negative      Nitrite, UA Negative      Urobilinogen, UA Negative      Leukocytes, UA Negative      Narrative:     Specimen Source->Urine   CBC W/ AUTO DIFFERENTIAL    WBC 10.44      RBC 4.76      Hemoglobin 13.2      Hematocrit 40.7      MCV 86      MCH 27.7      MCHC 32.4      RDW 14.4      Platelets 333      MPV 9.5      Immature Granulocytes 0.4      Gran # (ANC) 7.0      Immature Grans (Abs) 0.04      Lymph # 2.5      Mono # 0.7      Eos # 0.1      Baso # 0.07      nRBC 0      Gran % 67.4      Lymph % 24.3      Mono % 6.5      Eosinophil % 0.7      Basophil % 0.7      Differential Method Automated     PHOSPHORUS    Phosphorus 3.3     MAGNESIUM    Magnesium 2.2     LACTIC ACID, PLASMA    Lactate (Lactic Acid) 1.4     DRUG SCREEN PANEL, URINE EMERGENCY    Benzodiazepines Negative      Cocaine (Metab.) Negative      Opiate Scrn, Ur Negative      Barbiturate Screen, Ur Negative      Amphetamine Screen, Ur Negative      THC Negative      Phencyclidine Negative      Creatinine, Urine 28.0      Toxicology Information SEE COMMENT      Narrative:     Specimen Source->Urine   TSH    TSH 1.116     POCT URINE PREGNANCY    POC Preg Test, Ur Negative       Acceptable Yes       EKG Readings: (Independently Interpreted)   Rhythm: Normal Sinus Rhythm. Heart Rate: 75. Ectopy: No Ectopy. Conduction: Normal. ST Segments: Normal ST Segments. T  Waves: Normal. Clinical Impression: Normal Sinus Rhythm       Imaging Results              CT Head Without Contrast (Final result)  Result time 01/02/25 18:10:45      Final result by Alejandrina Bundy MD (01/02/25 18:10:45)                   Impression:      No acute findings.      Electronically signed by: Alejandrina Bundy  Date:    01/02/2025  Time:    18:10               Narrative:    EXAMINATION:  CT HEAD WITHOUT CONTRAST    CLINICAL HISTORY:  Seizure, new-onset, no history of trauma;    TECHNIQUE:  Low dose axial images were obtained through the head.  Coronal and sagittal reformations were also performed. Contrast was not administered.    COMPARISON:  None.    FINDINGS:  Intracranial compartment:    Ventricles and sulci are normal in size for age without evidence of hydrocephalus. Mildly prominent left retro cerebellar CSF space.    The brain parenchyma appears normal. No parenchymal mass, hemorrhage, edema or major vascular distribution infarct.    Skull/extracranial contents (limited evaluation): No fracture. Mastoid air cells and paranasal sinuses are essentially clear.                                       Medications - No data to display  Medical Decision Making  34-year-old woman presents emergency department for seizure-like activity after falling down, possible syncopal/near syncopal episode preceding the seizure activity after she fell.  No head trauma.  Back to neurological baseline, no history of seizures no history of drug use.  Drug screen negative.  White blood cell count normal at 10 K. no evidence of hypoglycemia potassium 4.3 sodium 139.  Renal function is normal.  CT head without contrast was performed showing no acute abnormalities per my interpretation.  No masses or intracranial hemorrhage.  No evidence of trauma.  No apparent postictal state.  Will refer to Neurology for further evaluation.  Return precautions discussed.  Discharged in no acute distress.    Amount and/or Complexity  of Data Reviewed  Labs: ordered.  Radiology: ordered.                                      Clinical Impression:  Final diagnoses:  [R56.9] Seizure-like activity (Primary)  [W19.XXXA] Fall, initial encounter          ED Disposition Condition    Discharge           ED Prescriptions    None       Follow-up Information       Follow up With Specialties Details Why Contact Info Additional Information    [facility] - Emergency Dept Emergency Medicine  As needed, If symptoms worsen [address] [facility] [id number] [phone] 1st floor    Esau Colon MD Family Medicine, Home Health Services, Hospice Services Schedule an appointment as soon as possible for a visit   [address] [address]  [address]  [place] [address]  [phone]                August Gordon MD  01/02/25 8405

## 2025-01-05 LAB
OHS QRS DURATION: 80 MS
OHS QTC CALCULATION: 424 MS

## 2025-01-09 DIAGNOSIS — D21.9 FIBROIDS: Primary | ICD-10-CM

## 2025-01-10 ENCOUNTER — HOSPITAL ENCOUNTER (OUTPATIENT)
Dept: RADIOLOGY | Facility: HOSPITAL | Age: 35
Discharge: HOME | End: 2025-01-10
Attending: HEALTH CARE PROVIDER
Payer: OTHER GOVERNMENT

## 2025-01-10 DIAGNOSIS — D21.9 FIBROIDS: ICD-10-CM

## 2025-01-10 PROCEDURE — 76856 US EXAM PELVIC COMPLETE: CPT | Mod: TC,PO
